# Patient Record
Sex: FEMALE | Race: AMERICAN INDIAN OR ALASKA NATIVE | Employment: OTHER | URBAN - METROPOLITAN AREA
[De-identification: names, ages, dates, MRNs, and addresses within clinical notes are randomized per-mention and may not be internally consistent; named-entity substitution may affect disease eponyms.]

---

## 2017-03-02 PROBLEM — E10.8 TYPE 1 DIABETES MELLITUS WITH COMPLICATION (HCC): Status: ACTIVE | Noted: 2017-03-02

## 2017-03-15 PROBLEM — D72.829 LEUKOCYTOSIS (LEUCOCYTOSIS): Status: ACTIVE | Noted: 2017-03-15

## 2018-03-16 ENCOUNTER — HOSPITAL ENCOUNTER (OUTPATIENT)
Dept: GENERAL RADIOLOGY | Age: 28
Discharge: HOME | End: 2018-03-18
Payer: COMMERCIAL

## 2018-03-16 ENCOUNTER — HOSPITAL ENCOUNTER (OUTPATIENT)
Age: 28
Discharge: HOME | End: 2018-03-16
Payer: COMMERCIAL

## 2018-03-16 ENCOUNTER — HOSPITAL ENCOUNTER (OUTPATIENT)
Age: 28
Discharge: HOME | End: 2018-03-18
Payer: COMMERCIAL

## 2018-03-16 ENCOUNTER — OFFICE VISIT (OUTPATIENT)
Dept: FAMILY MEDICINE CLINIC | Age: 28
End: 2018-03-16
Payer: COMMERCIAL

## 2018-03-16 VITALS
DIASTOLIC BLOOD PRESSURE: 70 MMHG | TEMPERATURE: 98.2 F | RESPIRATION RATE: 12 BRPM | HEIGHT: 65 IN | WEIGHT: 293 LBS | HEART RATE: 101 BPM | BODY MASS INDEX: 48.82 KG/M2 | OXYGEN SATURATION: 98 % | SYSTOLIC BLOOD PRESSURE: 126 MMHG

## 2018-03-16 DIAGNOSIS — S61.211A LACERATION OF LEFT INDEX FINGER WITHOUT FOREIGN BODY WITHOUT DAMAGE TO NAIL, INITIAL ENCOUNTER: ICD-10-CM

## 2018-03-16 DIAGNOSIS — Z48.02 ENCOUNTER FOR REMOVAL OF SUTURES: Primary | ICD-10-CM

## 2018-03-16 DIAGNOSIS — G89.29 CHRONIC RIGHT-SIDED LOW BACK PAIN WITHOUT SCIATICA: ICD-10-CM

## 2018-03-16 DIAGNOSIS — M54.50 CHRONIC RIGHT-SIDED LOW BACK PAIN WITHOUT SCIATICA: ICD-10-CM

## 2018-03-16 DIAGNOSIS — E66.01 MORBID OBESITY (HCC): ICD-10-CM

## 2018-03-16 LAB — TSH SERPL DL<=0.05 MIU/L-ACNC: 2.16 UIU/ML (ref 0.27–4.2)

## 2018-03-16 PROCEDURE — 99213 OFFICE O/P EST LOW 20 MIN: CPT | Performed by: PHYSICIAN ASSISTANT

## 2018-03-16 PROCEDURE — 72110 X-RAY EXAM L-2 SPINE 4/>VWS: CPT

## 2018-03-16 PROCEDURE — 36415 COLL VENOUS BLD VENIPUNCTURE: CPT

## 2018-03-16 PROCEDURE — 84443 ASSAY THYROID STIM HORMONE: CPT

## 2018-03-16 NOTE — PROGRESS NOTES
Chief Complaint:   Suture / Staple Removal (stitches were in 10 days in left pointer finger)      History of Present Illness   Source of history provided by: patient. Eilene Mcburney is a 32 y.o. old female presenting to the [facility] care for removal of sutures from the left second digit, which were placed 10 days ago. Pt denies any drainage from the wound site, pain or bleeding from the area, or wound dehiscence. No fever or chills noted per pt. She states three sutures were placed at the site. ROS    Unless otherwise stated in this report or unable to obtain because of the patient's clinical or mental status as evidenced by the medical record, this patients's positive and negative responses for Review of Systems, constitutional, psych, eyes, ENT, cardiovascular, respiratory, gastrointestinal, neurological, genitourinary, musculoskeletal, integument systems and systems related to the presenting problem are either stated in the preceding or were not pertinent or were negative for the symptoms and/or complaints related to the medical problem. Social History:  reports that she is a non-smoker but has been exposed to tobacco smoke. She has never used smokeless tobacco. She reports that she drinks alcohol. She reports that she uses drugs, including Marijuana, about 1 time per week. Family History: family history includes Arthritis in her mother; Asthma in her mother; Cancer in her paternal grandfather; Diabetes in her mother, paternal grandmother, and sister; Heart Attack in her father; Heart Disease in her father and maternal grandfather; Stroke in her maternal grandmother.   Allergies: Sulfa antibiotics    Physical Exam         VS:  /70   Pulse 101   Temp 98.2 °F (36.8 °C)   Resp 12   Ht 5' 5\" (1.651 m)   Wt (!) 393 lb (178.3 kg)   LMP 02/21/2018 (Approximate)   SpO2 98%   BMI 65.40 kg/m²    Oxygen Saturation Interpretation: Normal.    Constitutional:  Alert, development consistent with

## 2018-04-05 ENCOUNTER — OFFICE VISIT (OUTPATIENT)
Dept: ENDOCRINOLOGY | Age: 28
End: 2018-04-05
Payer: COMMERCIAL

## 2018-04-05 ENCOUNTER — HOSPITAL ENCOUNTER (OUTPATIENT)
Age: 28
Setting detail: SPECIMEN
Discharge: HOME | End: 2018-04-05
Payer: COMMERCIAL

## 2018-04-05 VITALS
WEIGHT: 293 LBS | RESPIRATION RATE: 22 BRPM | OXYGEN SATURATION: 99 % | BODY MASS INDEX: 48.82 KG/M2 | HEIGHT: 65 IN | HEART RATE: 122 BPM | DIASTOLIC BLOOD PRESSURE: 68 MMHG | SYSTOLIC BLOOD PRESSURE: 132 MMHG | TEMPERATURE: 98.7 F

## 2018-04-05 DIAGNOSIS — E10.9 DM TYPE 1, NOT AT GOAL (HCC): Primary | ICD-10-CM

## 2018-04-05 DIAGNOSIS — E10.9 DM TYPE 1, NOT AT GOAL (HCC): ICD-10-CM

## 2018-04-05 LAB
ALBUMIN SERPL-MCNC: 3.6 G/DL (ref 3.5–5.2)
ALP BLD-CCNC: 93 U/L (ref 35–104)
ALT SERPL-CCNC: 30 U/L (ref 0–32)
ANION GAP SERPL CALCULATED.3IONS-SCNC: 15 MMOL/L (ref 7–16)
AST SERPL-CCNC: 22 U/L (ref 0–31)
BILIRUB SERPL-MCNC: <0.2 MG/DL (ref 0–1.2)
BUN BLDV-MCNC: 12 MG/DL (ref 6–20)
CALCIUM SERPL-MCNC: 9.2 MG/DL (ref 8.6–10.2)
CHLORIDE BLD-SCNC: 99 MMOL/L (ref 98–107)
CO2: 21 MMOL/L (ref 22–29)
CREAT SERPL-MCNC: 0.5 MG/DL (ref 0.5–1)
GFR AFRICAN AMERICAN: >60
GFR NON-AFRICAN AMERICAN: >60 ML/MIN/1.73
GLUCOSE BLD-MCNC: 144 MG/DL (ref 74–109)
HBA1C MFR BLD: 6.4 %
POTASSIUM SERPL-SCNC: 4.2 MMOL/L (ref 3.5–5)
SODIUM BLD-SCNC: 135 MMOL/L (ref 132–146)
TOTAL PROTEIN: 8.1 G/DL (ref 6.4–8.3)

## 2018-04-05 PROCEDURE — 86337 INSULIN ANTIBODIES: CPT

## 2018-04-05 PROCEDURE — 84681 ASSAY OF C-PEPTIDE: CPT

## 2018-04-05 PROCEDURE — 83516 IMMUNOASSAY NONANTIBODY: CPT

## 2018-04-05 PROCEDURE — 86341 ISLET CELL ANTIBODY: CPT

## 2018-04-05 PROCEDURE — 36415 COLL VENOUS BLD VENIPUNCTURE: CPT

## 2018-04-05 PROCEDURE — 99205 OFFICE O/P NEW HI 60 MIN: CPT | Performed by: INTERNAL MEDICINE

## 2018-04-05 PROCEDURE — 83036 HEMOGLOBIN GLYCOSYLATED A1C: CPT | Performed by: INTERNAL MEDICINE

## 2018-04-05 PROCEDURE — 80053 COMPREHEN METABOLIC PANEL: CPT

## 2018-04-08 LAB
C-PEPTIDE: 1 NG/ML (ref 0.8–3.5)
GLUTAMIC ACID DECARB AB: <5 IU/ML (ref 0–5)

## 2018-04-10 LAB
Lab: NORMAL
REPORT: NORMAL
THIS TEST SENT TO: NORMAL

## 2018-04-13 DIAGNOSIS — E10.9 DM TYPE 1, NOT AT GOAL (HCC): Primary | ICD-10-CM

## 2018-04-17 LAB — INSULIN A: 3.3 U/ML (ref 0–0.4)

## 2018-04-30 DIAGNOSIS — E10.9 DM TYPE 1, NOT AT GOAL (HCC): ICD-10-CM

## 2018-04-30 DIAGNOSIS — E10.8 TYPE 1 DIABETES MELLITUS WITH COMPLICATION (HCC): Primary | ICD-10-CM

## 2018-04-30 RX ORDER — BLOOD-GLUCOSE METER
KIT MISCELLANEOUS
Qty: 1 KIT | Refills: 1 | Status: SHIPPED | OUTPATIENT
Start: 2018-04-30 | End: 2018-10-19

## 2018-05-15 ENCOUNTER — OFFICE VISIT (OUTPATIENT)
Dept: FAMILY MEDICINE CLINIC | Age: 28
End: 2018-05-15
Payer: COMMERCIAL

## 2018-05-15 VITALS
DIASTOLIC BLOOD PRESSURE: 90 MMHG | WEIGHT: 293 LBS | RESPIRATION RATE: 12 BRPM | HEART RATE: 108 BPM | TEMPERATURE: 98.7 F | SYSTOLIC BLOOD PRESSURE: 150 MMHG | HEIGHT: 65 IN | BODY MASS INDEX: 48.82 KG/M2 | OXYGEN SATURATION: 97 %

## 2018-05-15 DIAGNOSIS — H66.92 LEFT OTITIS MEDIA, UNSPECIFIED OTITIS MEDIA TYPE: Primary | ICD-10-CM

## 2018-05-15 PROCEDURE — G8417 CALC BMI ABV UP PARAM F/U: HCPCS | Performed by: PHYSICIAN ASSISTANT

## 2018-05-15 PROCEDURE — 1036F TOBACCO NON-USER: CPT | Performed by: PHYSICIAN ASSISTANT

## 2018-05-15 PROCEDURE — G8427 DOCREV CUR MEDS BY ELIG CLIN: HCPCS | Performed by: PHYSICIAN ASSISTANT

## 2018-05-15 PROCEDURE — 99213 OFFICE O/P EST LOW 20 MIN: CPT | Performed by: PHYSICIAN ASSISTANT

## 2018-05-15 RX ORDER — FLUCONAZOLE 150 MG/1
150 TABLET ORAL ONCE
Qty: 1 TABLET | Refills: 0 | Status: SHIPPED | OUTPATIENT
Start: 2018-05-15 | End: 2018-05-15

## 2018-05-15 RX ORDER — FLUTICASONE PROPIONATE 50 MCG
1 SPRAY, SUSPENSION (ML) NASAL DAILY
Qty: 1 BOTTLE | Refills: 0 | Status: SHIPPED | OUTPATIENT
Start: 2018-05-15

## 2018-05-15 RX ORDER — NORETHINDRONE
KIT
Refills: 13 | COMMUNITY
Start: 2018-04-09

## 2018-05-15 RX ORDER — LORATADINE 10 MG/1
10 TABLET ORAL DAILY
Qty: 7 TABLET | Refills: 0 | Status: SHIPPED | OUTPATIENT
Start: 2018-05-15 | End: 2018-05-22

## 2018-05-15 RX ORDER — AMOXICILLIN AND CLAVULANATE POTASSIUM 875; 125 MG/1; MG/1
1 TABLET, FILM COATED ORAL 2 TIMES DAILY
Qty: 20 TABLET | Refills: 0 | Status: SHIPPED | OUTPATIENT
Start: 2018-05-15 | End: 2018-05-25

## 2018-05-25 ENCOUNTER — OFFICE VISIT (OUTPATIENT)
Dept: FAMILY MEDICINE CLINIC | Age: 28
End: 2018-05-25
Payer: COMMERCIAL

## 2018-05-25 VITALS
WEIGHT: 293 LBS | BODY MASS INDEX: 48.82 KG/M2 | HEIGHT: 65 IN | DIASTOLIC BLOOD PRESSURE: 78 MMHG | OXYGEN SATURATION: 98 % | HEART RATE: 113 BPM | SYSTOLIC BLOOD PRESSURE: 126 MMHG | RESPIRATION RATE: 18 BRPM

## 2018-05-25 DIAGNOSIS — M79.2 NERVE PAIN: Primary | ICD-10-CM

## 2018-05-25 DIAGNOSIS — H65.33 BILATERAL CHRONIC SECRETORY OTITIS MEDIA: ICD-10-CM

## 2018-05-25 DIAGNOSIS — H60.503 ACUTE OTITIS EXTERNA OF BOTH EARS, UNSPECIFIED TYPE: ICD-10-CM

## 2018-05-25 DIAGNOSIS — E10.9 DM TYPE 1, NOT AT GOAL (HCC): ICD-10-CM

## 2018-05-25 PROCEDURE — G8417 CALC BMI ABV UP PARAM F/U: HCPCS | Performed by: STUDENT IN AN ORGANIZED HEALTH CARE EDUCATION/TRAINING PROGRAM

## 2018-05-25 PROCEDURE — G8427 DOCREV CUR MEDS BY ELIG CLIN: HCPCS | Performed by: STUDENT IN AN ORGANIZED HEALTH CARE EDUCATION/TRAINING PROGRAM

## 2018-05-25 PROCEDURE — 3044F HG A1C LEVEL LT 7.0%: CPT | Performed by: STUDENT IN AN ORGANIZED HEALTH CARE EDUCATION/TRAINING PROGRAM

## 2018-05-25 PROCEDURE — 2022F DILAT RTA XM EVC RTNOPTHY: CPT | Performed by: STUDENT IN AN ORGANIZED HEALTH CARE EDUCATION/TRAINING PROGRAM

## 2018-05-25 PROCEDURE — 1036F TOBACCO NON-USER: CPT | Performed by: STUDENT IN AN ORGANIZED HEALTH CARE EDUCATION/TRAINING PROGRAM

## 2018-05-25 PROCEDURE — 99213 OFFICE O/P EST LOW 20 MIN: CPT | Performed by: STUDENT IN AN ORGANIZED HEALTH CARE EDUCATION/TRAINING PROGRAM

## 2018-05-25 PROCEDURE — 4130F TOPICAL PREP RX AOE: CPT | Performed by: STUDENT IN AN ORGANIZED HEALTH CARE EDUCATION/TRAINING PROGRAM

## 2018-05-25 RX ORDER — ACETAMINOPHEN 500 MG
1000 TABLET ORAL EVERY 8 HOURS PRN
Qty: 90 TABLET | Refills: 3 | Status: SHIPPED | OUTPATIENT
Start: 2018-05-25

## 2018-05-25 RX ORDER — CIPROFLOXACIN AND DEXAMETHASONE 3; 1 MG/ML; MG/ML
4 SUSPENSION/ DROPS AURICULAR (OTIC) 2 TIMES DAILY
Qty: 1 BOTTLE | Refills: 0 | Status: SHIPPED | OUTPATIENT
Start: 2018-05-25 | End: 2018-06-13

## 2018-05-25 RX ORDER — LORATADINE 10 MG/1
10 TABLET ORAL DAILY
Qty: 30 TABLET | Refills: 0 | Status: SHIPPED | OUTPATIENT
Start: 2018-05-25

## 2018-05-25 RX ORDER — MELOXICAM 15 MG/1
15 TABLET ORAL DAILY
Qty: 30 TABLET | Refills: 3 | Status: SHIPPED | OUTPATIENT
Start: 2018-05-25 | End: 2018-06-29 | Stop reason: SDUPTHER

## 2018-05-25 RX ORDER — LISINOPRIL 2.5 MG/1
2.5 TABLET ORAL DAILY
Qty: 30 TABLET | Refills: 3 | Status: SHIPPED | OUTPATIENT
Start: 2018-05-25 | End: 2018-06-29 | Stop reason: SDUPTHER

## 2018-05-25 ASSESSMENT — PATIENT HEALTH QUESTIONNAIRE - PHQ9
1. LITTLE INTEREST OR PLEASURE IN DOING THINGS: 1
SUM OF ALL RESPONSES TO PHQ9 QUESTIONS 1 & 2: 2
2. FEELING DOWN, DEPRESSED OR HOPELESS: 1
SUM OF ALL RESPONSES TO PHQ QUESTIONS 1-9: 2

## 2018-05-30 ENCOUNTER — OFFICE VISIT (OUTPATIENT)
Dept: ENDOCRINOLOGY | Age: 28
End: 2018-05-30
Payer: COMMERCIAL

## 2018-05-30 VITALS
TEMPERATURE: 97.8 F | BODY MASS INDEX: 48.82 KG/M2 | HEART RATE: 118 BPM | OXYGEN SATURATION: 97 % | WEIGHT: 293 LBS | HEIGHT: 65 IN | SYSTOLIC BLOOD PRESSURE: 118 MMHG | DIASTOLIC BLOOD PRESSURE: 72 MMHG

## 2018-05-30 DIAGNOSIS — E10.8 TYPE 1 DIABETES MELLITUS WITH COMPLICATION (HCC): Primary | ICD-10-CM

## 2018-05-30 PROCEDURE — 3044F HG A1C LEVEL LT 7.0%: CPT | Performed by: INTERNAL MEDICINE

## 2018-05-30 PROCEDURE — 1036F TOBACCO NON-USER: CPT | Performed by: INTERNAL MEDICINE

## 2018-05-30 PROCEDURE — 99214 OFFICE O/P EST MOD 30 MIN: CPT | Performed by: INTERNAL MEDICINE

## 2018-05-30 PROCEDURE — G8427 DOCREV CUR MEDS BY ELIG CLIN: HCPCS | Performed by: INTERNAL MEDICINE

## 2018-05-30 PROCEDURE — 2022F DILAT RTA XM EVC RTNOPTHY: CPT | Performed by: INTERNAL MEDICINE

## 2018-05-30 PROCEDURE — G8417 CALC BMI ABV UP PARAM F/U: HCPCS | Performed by: INTERNAL MEDICINE

## 2018-05-30 ASSESSMENT — ENCOUNTER SYMPTOMS
CONSTIPATION: 0
COUGH: 1
ORTHOPNEA: 0
SORE THROAT: 0
WHEEZING: 0
NAUSEA: 0
SHORTNESS OF BREATH: 0
DIARRHEA: 0
BACK PAIN: 0
BLURRED VISION: 0
ABDOMINAL PAIN: 0
VOMITING: 0
HEARTBURN: 0

## 2018-06-29 ENCOUNTER — OFFICE VISIT (OUTPATIENT)
Dept: FAMILY MEDICINE CLINIC | Age: 28
End: 2018-06-29
Payer: COMMERCIAL

## 2018-06-29 VITALS
RESPIRATION RATE: 18 BRPM | HEIGHT: 65 IN | BODY MASS INDEX: 48.82 KG/M2 | DIASTOLIC BLOOD PRESSURE: 88 MMHG | SYSTOLIC BLOOD PRESSURE: 134 MMHG | WEIGHT: 293 LBS | HEART RATE: 109 BPM | OXYGEN SATURATION: 97 %

## 2018-06-29 DIAGNOSIS — E10.9 DM TYPE 1, NOT AT GOAL (HCC): ICD-10-CM

## 2018-06-29 DIAGNOSIS — M25.551 PAIN OF RIGHT HIP JOINT: ICD-10-CM

## 2018-06-29 DIAGNOSIS — M25.511 CHRONIC RIGHT SHOULDER PAIN: Primary | ICD-10-CM

## 2018-06-29 DIAGNOSIS — G89.29 CHRONIC RIGHT SHOULDER PAIN: Primary | ICD-10-CM

## 2018-06-29 DIAGNOSIS — M79.2 NERVE PAIN: ICD-10-CM

## 2018-06-29 PROCEDURE — 3044F HG A1C LEVEL LT 7.0%: CPT | Performed by: STUDENT IN AN ORGANIZED HEALTH CARE EDUCATION/TRAINING PROGRAM

## 2018-06-29 PROCEDURE — G8427 DOCREV CUR MEDS BY ELIG CLIN: HCPCS | Performed by: STUDENT IN AN ORGANIZED HEALTH CARE EDUCATION/TRAINING PROGRAM

## 2018-06-29 PROCEDURE — 1036F TOBACCO NON-USER: CPT | Performed by: STUDENT IN AN ORGANIZED HEALTH CARE EDUCATION/TRAINING PROGRAM

## 2018-06-29 PROCEDURE — 99213 OFFICE O/P EST LOW 20 MIN: CPT | Performed by: STUDENT IN AN ORGANIZED HEALTH CARE EDUCATION/TRAINING PROGRAM

## 2018-06-29 PROCEDURE — G8417 CALC BMI ABV UP PARAM F/U: HCPCS | Performed by: STUDENT IN AN ORGANIZED HEALTH CARE EDUCATION/TRAINING PROGRAM

## 2018-06-29 PROCEDURE — 2022F DILAT RTA XM EVC RTNOPTHY: CPT | Performed by: STUDENT IN AN ORGANIZED HEALTH CARE EDUCATION/TRAINING PROGRAM

## 2018-06-29 RX ORDER — MELOXICAM 15 MG/1
15 TABLET ORAL DAILY
Qty: 90 TABLET | Refills: 0 | Status: SHIPPED | OUTPATIENT
Start: 2018-06-29

## 2018-06-29 RX ORDER — LISINOPRIL 2.5 MG/1
2.5 TABLET ORAL DAILY
Qty: 90 TABLET | Refills: 0 | Status: SHIPPED | OUTPATIENT
Start: 2018-06-29

## 2018-06-29 ASSESSMENT — ENCOUNTER SYMPTOMS
CONSTIPATION: 0
WHEEZING: 0
SORE THROAT: 0
ABDOMINAL PAIN: 0
ORTHOPNEA: 0
COUGH: 0
DIARRHEA: 0
BACK PAIN: 0
VOMITING: 0
BLURRED VISION: 0
NAUSEA: 0
HEARTBURN: 0
SHORTNESS OF BREATH: 0

## 2018-06-29 NOTE — PROGRESS NOTES
fever and malaise/fatigue. HENT: Negative for congestion and sore throat. Eyes: Negative for blurred vision. Respiratory: Negative for cough, shortness of breath and wheezing. Cardiovascular: Negative for chest pain, palpitations, orthopnea, claudication, leg swelling and PND. Gastrointestinal: Negative for abdominal pain, constipation, diarrhea, heartburn, nausea and vomiting. Genitourinary: Negative for dysuria and hematuria. Musculoskeletal: Negative for back pain, joint pain and myalgias. Skin: Negative for rash. Neurological: Negative for dizziness, sensory change, focal weakness and headaches. Psychiatric/Behavioral: Negative. PE:  VS:  /88   Pulse 109   Resp 18   Ht 5' 5.25\" (1.657 m)   Wt (!) 389 lb (176.4 kg)   SpO2 97%   BMI 64.24 kg/m²   Physical Exam   Constitutional: She is oriented to person, place, and time. She appears well-developed and well-nourished. No distress. HENT:   Head: Normocephalic and atraumatic. Right Ear: External ear normal.   Left Ear: External ear normal.   Eyes: EOM are normal. Pupils are equal, round, and reactive to light. Right eye exhibits no discharge. Left eye exhibits no discharge. Neck: Neck supple. No JVD present. No thyromegaly present. Cardiovascular:   No murmur heard. Pulmonary/Chest: Breath sounds normal. No respiratory distress. She has no wheezes. She exhibits no tenderness. Abdominal: She exhibits no distension. There is no tenderness. There is no rebound and no guarding. Musculoskeletal: She exhibits no edema or tenderness. The drop sign on the right shoulder was positive. She also had pain on abduction against resistance. Lymphadenopathy:     She has no cervical adenopathy. Neurological: She is alert and oriented to person, place, and time. She has normal reflexes. No cranial nerve deficit. She exhibits normal muscle tone. Skin: Skin is warm and dry. No rash noted. She is not diaphoretic. No erythema.

## 2018-07-05 ENCOUNTER — HOSPITAL ENCOUNTER (OUTPATIENT)
Dept: GENERAL RADIOLOGY | Age: 28
Discharge: HOME | End: 2018-07-07
Payer: COMMERCIAL

## 2018-07-05 ENCOUNTER — HOSPITAL ENCOUNTER (OUTPATIENT)
Age: 28
Discharge: HOME | End: 2018-07-07
Payer: COMMERCIAL

## 2018-07-05 DIAGNOSIS — M25.551 PAIN OF RIGHT HIP JOINT: ICD-10-CM

## 2018-07-05 PROCEDURE — 73521 X-RAY EXAM HIPS BI 2 VIEWS: CPT

## 2018-07-05 PROCEDURE — 73030 X-RAY EXAM OF SHOULDER: CPT

## 2018-10-02 DIAGNOSIS — E10.9 DM TYPE 1, NOT AT GOAL (HCC): ICD-10-CM

## 2018-10-07 ENCOUNTER — TELEPHONE (OUTPATIENT)
Dept: FAMILY MEDICINE CLINIC | Age: 28
End: 2018-10-07

## 2018-10-19 DIAGNOSIS — E10.8 TYPE 1 DIABETES MELLITUS WITH COMPLICATION (HCC): Primary | ICD-10-CM

## 2018-10-23 DIAGNOSIS — E10.8 TYPE 1 DIABETES MELLITUS WITH COMPLICATION (HCC): ICD-10-CM

## 2018-11-27 DIAGNOSIS — E10.9 DM TYPE 1, NOT AT GOAL (HCC): ICD-10-CM

## 2019-09-13 ENCOUNTER — TELEPHONE (OUTPATIENT)
Dept: ADMINISTRATIVE | Age: 29
End: 2019-09-13